# Patient Record
Sex: MALE | Race: WHITE | NOT HISPANIC OR LATINO | Employment: FULL TIME | URBAN - NONMETROPOLITAN AREA
[De-identification: names, ages, dates, MRNs, and addresses within clinical notes are randomized per-mention and may not be internally consistent; named-entity substitution may affect disease eponyms.]

---

## 2019-03-07 ENCOUNTER — OFFICE VISIT (OUTPATIENT)
Dept: MEDICAL GROUP | Facility: CLINIC | Age: 49
End: 2019-03-07
Payer: COMMERCIAL

## 2019-03-07 VITALS
SYSTOLIC BLOOD PRESSURE: 136 MMHG | OXYGEN SATURATION: 99 % | RESPIRATION RATE: 16 BRPM | BODY MASS INDEX: 32.02 KG/M2 | HEART RATE: 86 BPM | HEIGHT: 67 IN | TEMPERATURE: 98.5 F | WEIGHT: 204 LBS | DIASTOLIC BLOOD PRESSURE: 83 MMHG

## 2019-03-07 DIAGNOSIS — H93.8X1 CONGESTION OF RIGHT EAR: ICD-10-CM

## 2019-03-07 DIAGNOSIS — J40 BRONCHITIS: ICD-10-CM

## 2019-03-07 PROCEDURE — 99203 OFFICE O/P NEW LOW 30 MIN: CPT | Performed by: PHYSICIAN ASSISTANT

## 2019-03-07 RX ORDER — AMOXICILLIN AND CLAVULANATE POTASSIUM 875; 125 MG/1; MG/1
1 TABLET, FILM COATED ORAL 2 TIMES DAILY
Qty: 20 TAB | Refills: 0 | Status: SHIPPED | OUTPATIENT
Start: 2019-03-07 | End: 2019-08-06

## 2019-03-07 RX ORDER — ALBUTEROL SULFATE 90 UG/1
2 AEROSOL, METERED RESPIRATORY (INHALATION) EVERY 6 HOURS PRN
Qty: 8.5 G | Refills: 1 | Status: SHIPPED | OUTPATIENT
Start: 2019-03-07 | End: 2019-08-06

## 2019-03-07 RX ORDER — METHYLPREDNISOLONE 4 MG/1
4 TABLET ORAL DAILY
Qty: 1 KIT | Refills: 0 | Status: SHIPPED | OUTPATIENT
Start: 2019-03-07 | End: 2019-08-06

## 2019-03-07 ASSESSMENT — PATIENT HEALTH QUESTIONNAIRE - PHQ9: CLINICAL INTERPRETATION OF PHQ2 SCORE: 0

## 2019-03-07 NOTE — PROGRESS NOTES
"cc:  illness    Subjective:     Gerry Rose is a 48 y.o. male presenting for fatigue      Patient states that for the last 5-6 days he has been fatigues, sleeping frequently, feverish and hands and feet cold.  He has been diaphoretic.  He has been wheezing and feels fluid in the right ear.  He states that abx usually clear up the fluid.  He has had sinus congestion and a cough.  He had similar symptoms from November to January.  He states it felt like his ribs were broken.  He has been off work for 4 days.      Review of systems:  See above. Denies any other symptoms unless previously indicated.       Current Outpatient Prescriptions:   •  amoxicillin-clavulanate (AUGMENTIN) 875-125 MG Tab, Take 1 Tab by mouth 2 times a day., Disp: 20 Tab, Rfl: 0  •  MethylPREDNISolone (MEDROL DOSEPAK) 4 MG Tablet Therapy Pack, Take 1 Tab by mouth every day., Disp: 1 Kit, Rfl: 0  •  albuterol 108 (90 Base) MCG/ACT Aero Soln inhalation aerosol, Inhale 2 Puffs by mouth every 6 hours as needed for Shortness of Breath., Disp: 8.5 g, Rfl: 1    Allergies, past medical history, past surgical history, family history, social history reviewed and updated    Objective:     Vitals: /83 (BP Location: Right arm, Patient Position: Sitting)   Pulse 86   Temp 36.9 °C (98.5 °F)   Resp 16   Ht 1.702 m (5' 7\")   Wt 92.5 kg (204 lb)   SpO2 99%   BMI 31.95 kg/m²   General: Alert, pleasant, NAD  HEENT: Normocephalic.  EOMI, no icterus or pallor.  Conjunctivae and lids normal. External ears normal.  Internal ear canals clear left side wax versus perforation eardrum right ear.  Believe this is most likely wax.  Oropharynx non-erythematous, mucous membranes moist.  Neck supple.  No cervical or supraclavicular lymphadenopathy.  Heart: Regular rate and rhythm.  S1 and S2 normal.  No murmurs appreciated.  Respiratory: Wheezing in all lung fields.  Rhonchi lower bases.  Skin: Warm, dry, no rashes.  Musculoskeletal: Gait is normal.  Moves all " extremities well.  Neuro: Cranial nerves II through XII intact.  No focal deficits noted.  Psych:  Affect/mood is normal, judgement is good, memory is intact, grooming is appropriate.    Assessment/Plan:     Gerry was seen today for cough and fever.    Diagnoses and all orders for this visit:    Bronchitis  -     amoxicillin-clavulanate (AUGMENTIN) 875-125 MG Tab; Take 1 Tab by mouth 2 times a day.  -     MethylPREDNISolone (MEDROL DOSEPAK) 4 MG Tablet Therapy Pack; Take 1 Tab by mouth every day.  -     albuterol 108 (90 Base) MCG/ACT Aero Soln inhalation aerosol; Inhale 2 Puffs by mouth every 6 hours as needed for Shortness of Breath.  Congestion of right ear    Cannot exclude pneumonia.  Will start patient on Augmentin Medrol Dosepak and albuterol we will follow-up in 1-2 weeks and will recheck right ear.  Do not believe he has a perforated eardrum but would like to reevaluate to make sure.    Return in about 2 weeks (around 3/21/2019) for 1-2 weeks.

## 2019-08-06 ENCOUNTER — OFFICE VISIT (OUTPATIENT)
Dept: MEDICAL GROUP | Facility: CLINIC | Age: 49
End: 2019-08-06
Payer: COMMERCIAL

## 2019-08-06 VITALS
WEIGHT: 194 LBS | BODY MASS INDEX: 30.45 KG/M2 | OXYGEN SATURATION: 96 % | RESPIRATION RATE: 16 BRPM | TEMPERATURE: 98.3 F | HEART RATE: 82 BPM | HEIGHT: 67 IN | SYSTOLIC BLOOD PRESSURE: 190 MMHG | DIASTOLIC BLOOD PRESSURE: 104 MMHG

## 2019-08-06 DIAGNOSIS — N52.1 ERECTILE DYSFUNCTION DUE TO DISEASES CLASSIFIED ELSEWHERE: ICD-10-CM

## 2019-08-06 DIAGNOSIS — J30.2 SEASONAL ALLERGIES: ICD-10-CM

## 2019-08-06 DIAGNOSIS — R03.0 ELEVATED BLOOD PRESSURE READING: ICD-10-CM

## 2019-08-06 PROBLEM — J40 BRONCHITIS: Status: RESOLVED | Noted: 2019-03-07 | Resolved: 2019-08-06

## 2019-08-06 PROBLEM — H93.8X1 CONGESTION OF RIGHT EAR: Status: RESOLVED | Noted: 2019-03-07 | Resolved: 2019-08-06

## 2019-08-06 PROCEDURE — 99213 OFFICE O/P EST LOW 20 MIN: CPT | Mod: 25 | Performed by: PHYSICIAN ASSISTANT

## 2019-08-06 RX ORDER — LORATADINE 10 MG/1
10 TABLET ORAL DAILY
COMMUNITY

## 2019-08-06 NOTE — PROGRESS NOTES
"cc:  Medication refill    Subjective:     Gerry Rose is a 48 y.o. male presenting for medication refill      Patient presents to the office for medication refill.  Patient indicates that he is here for a cialis Rx.  He states that he has not been active for 8 years.  He states that a friend gave him a sample years ago and it worked well for him.  He has been feeling well.  He has been somewhat anxious lately.  He states that lately has gone back to drink edmund energy drinks.      Patient indicates that he does have a history of a deviated septum and is having issues with allergies.  He states that he has this continual drainage feeling from his nose and his right ear.  Claritin does help with this and he would like to continue with its use.    Review of systems:  See above.  Denies any other symptoms unless previously indicated.      Current Outpatient Medications:   •  loratadine (CLARITIN) 10 MG Tab, Take 10 mg by mouth every day., Disp: , Rfl:     Allergies, past medical history, past surgical history, family history, social history reviewed and updated    Objective:     Vitals: BP (!) 190/104 (BP Location: Right arm, Patient Position: Sitting)   Pulse 82   Temp 36.8 °C (98.3 °F)   Resp 16   Ht 1.702 m (5' 7\")   Wt 88 kg (194 lb)   SpO2 96%   BMI 30.38 kg/m²    General: Alert, pleasant, NAD  EYES:   PERRL, EOMI, no icterus or pallor.  Conjunctivae and lids normal.   HENT:  Normocephalic.  External ears normal. Tympanic membranes pearly, opaque.  No nasal drainage present.    Neck supple.   Heart: Regular rate and rhythm.  S1 and S2 normal.  No murmurs appreciated.  Respiratory: Normal respiratory effort.  Clear to auscultation bilaterally.  Abdomen: Not obese.  Skin: Warm, dry, no rashes.  Musculoskeletal: Gait is normal.  Moves all extremities well.  Extremities: No leg edema.  Pedal pulses 2+ symmetric.   Neurological: No tremors, sensation grossly intact, CN2-12 intact.  Psych:  Affect/mood is normal, " judgement is good, memory is intact, grooming is appropriate.    Assessment/Plan:     Gerry was seen today for medication refill.    Diagnoses and all orders for this visit:    Erectile dysfunction due to diseases classified elsewhere  Elevated blood pressure reading    Believe elevated blood pressure reading is most likely related to diet and stress levels.  I cannot provide Cialis Rx at this time with blood pressure readings so high.  Patient is not wanting to go on blood pressure medication.  Therefore we will follow-up in 2 weeks.  He will avoid caffeine improved diet and we will recheck his blood pressure.  If improved, will order Cialis.  If not we will need to consider blood pressure medication.  20 minutes waiting in office for recheck of blood pressure.    Seasonal allergies  Continue with Claritin as needed.      No follow-ups on file.    Please note that this dictation was created using voice recognition software. I have made every reasonable attempt to correct obvious errors, but expect that there are errors of grammar and possible content that I did not discover before finalizing note.

## 2019-08-20 ENCOUNTER — OFFICE VISIT (OUTPATIENT)
Dept: MEDICAL GROUP | Facility: CLINIC | Age: 49
End: 2019-08-20
Payer: COMMERCIAL

## 2019-08-20 VITALS
TEMPERATURE: 99.2 F | HEART RATE: 90 BPM | WEIGHT: 191 LBS | RESPIRATION RATE: 16 BRPM | HEIGHT: 67 IN | SYSTOLIC BLOOD PRESSURE: 154 MMHG | OXYGEN SATURATION: 97 % | DIASTOLIC BLOOD PRESSURE: 85 MMHG | BODY MASS INDEX: 29.98 KG/M2

## 2019-08-20 DIAGNOSIS — N52.1 ERECTILE DYSFUNCTION DUE TO DISEASES CLASSIFIED ELSEWHERE: ICD-10-CM

## 2019-08-20 DIAGNOSIS — R03.0 ELEVATED BLOOD PRESSURE READING: ICD-10-CM

## 2019-08-20 PROCEDURE — 99213 OFFICE O/P EST LOW 20 MIN: CPT | Performed by: PHYSICIAN ASSISTANT

## 2019-08-20 RX ORDER — TADALAFIL 10 MG/1
10 TABLET ORAL PRN
Qty: 10 TAB | Refills: 3 | Status: SHIPPED | OUTPATIENT
Start: 2019-08-20

## 2019-08-20 NOTE — PROGRESS NOTES
"cc:  hypertension    Subjective:     Gerry Rose is a 48 y.o. male presenting for hypertension      Patient was originally seen a couple of weeks ago requesting medication for ED.  However his blood pressure was found to be quite elevated at the time.  At that visit, we advised him to stop all caffeine/energy drinks and follow-up in a couple of weeks for recheck of his blood pressure.  He states that he has avoided supplements as well as he is concerned as they could be causing an elevated blood pressure.  He denies any other symptoms at this time.  He does acknowledge that diet could be playing a role in his elevated blood pressure indicating that he has been eating more foods with higher levels of salt.    Review of systems:  See above.  Denies any symptoms at this time.      Current Outpatient Medications:   •  tadalafil (CIALIS) 10 MG tablet, Take 1 Tab by mouth as needed for Erectile Dysfunction., Disp: 10 Tab, Rfl: 3  •  loratadine (CLARITIN) 10 MG Tab, Take 10 mg by mouth every day., Disp: , Rfl:     Allergies, past medical history, past surgical history, family history, social history reviewed and updated    Objective:     Vitals: /85 (BP Location: Left arm, Patient Position: Sitting, BP Cuff Size: Adult)   Pulse 90   Temp 37.3 °C (99.2 °F) (Temporal)   Resp 16   Ht 1.702 m (5' 7\")   Wt 86.6 kg (191 lb)   SpO2 97%   BMI 29.91 kg/m²   General: Alert, pleasant, NAD  EYES:   PERRL, EOMI, no icterus or pallor.  Conjunctivae and lids normal.   HENT:  Normocephalic.  External ears normal.  Neck supple.   Abdomen: Not obese  Skin: Warm, dry, no rashes.  Musculoskeletal: Gait is normal.  Moves all extremities well.  Neurological: No tremors, sensation grossly intact, CN2-12 intact.  Psych:  Affect/mood is normal, judgement is good, memory is intact, grooming is appropriate.    Assessment/Plan:     Gerry was seen today for follow-up.    Diagnoses and all orders for this visit:    Elevated blood " pressure reading    Improved.  Continue to work on dietary and exercise changes.  Follow-up in 4 weeks, sooner if needed peer    Erectile dysfunction due to diseases classified elsewhere  -     tadalafil (CIALIS) 10 MG tablet; Take 1 Tab by mouth as needed for Erectile Dysfunction.    Blood pressure is better.  We will try patient on Cialis.  Side effects of medication explained.    Return in about 4 weeks (around 9/17/2019), or if symptoms worsen or fail to improve.    Please note that this dictation was created using voice recognition software. I have made every reasonable attempt to correct obvious errors, but expect that there are errors of grammar and possible content that I did not discover before finalizing note.

## 2019-10-08 ENCOUNTER — OFFICE VISIT (OUTPATIENT)
Dept: MEDICAL GROUP | Facility: CLINIC | Age: 49
End: 2019-10-08
Payer: COMMERCIAL

## 2019-10-08 VITALS
DIASTOLIC BLOOD PRESSURE: 90 MMHG | WEIGHT: 193 LBS | HEIGHT: 67 IN | SYSTOLIC BLOOD PRESSURE: 150 MMHG | OXYGEN SATURATION: 96 % | RESPIRATION RATE: 16 BRPM | HEART RATE: 89 BPM | BODY MASS INDEX: 30.29 KG/M2 | TEMPERATURE: 98.4 F

## 2019-10-08 DIAGNOSIS — M67.40 GANGLION CYST: ICD-10-CM

## 2019-10-08 DIAGNOSIS — Z98.52 S/P VASECTOMY: ICD-10-CM

## 2019-10-08 DIAGNOSIS — K64.8 OTHER HEMORRHOIDS: ICD-10-CM

## 2019-10-08 DIAGNOSIS — L73.1 INGROWN HAIR: ICD-10-CM

## 2019-10-08 DIAGNOSIS — N52.1 ERECTILE DYSFUNCTION DUE TO DISEASES CLASSIFIED ELSEWHERE: ICD-10-CM

## 2019-10-08 DIAGNOSIS — K42.9 UMBILICAL HERNIA WITHOUT OBSTRUCTION AND WITHOUT GANGRENE: ICD-10-CM

## 2019-10-08 PROCEDURE — 99214 OFFICE O/P EST MOD 30 MIN: CPT | Performed by: PHYSICIAN ASSISTANT

## 2019-10-08 RX ORDER — CEPHALEXIN 250 MG/1
250 CAPSULE ORAL 4 TIMES DAILY
Qty: 40 CAP | Refills: 0 | Status: SHIPPED | OUTPATIENT
Start: 2019-10-08

## 2019-10-08 RX ORDER — SILDENAFIL CITRATE 20 MG/1
TABLET ORAL
Qty: 20 TAB | Refills: 2 | Status: SHIPPED | OUTPATIENT
Start: 2019-10-08

## 2019-10-08 NOTE — PROGRESS NOTES
cc:  effie    Subjective:     Gerry Rose is a 49 y.o. male presenting for waiver      Patient presents to the office for waiver.  He presents indicating that he needs a waiver in order to avoid shaving at work.  He finds that when he shaves that he develops folliculitis.  He actually has 2 spots in the groin that are present today because of shaving in the area.    Patient states that he is concerned about a herniated umbilical area. He is not having any pain.  He noticed this in the last 6 weeks.  He is not wanting to treat at this time but would like for this clinic to be aware.    Patient is wanting a semen analysis.  He has a history of a vasectomy.  He did not have a semen analysis after his vasectomy and would like to make sure he does not have a sperm count.      Patient states that he has a hemorrhoid,  He will have to push the hemorrhoid back in the rectum.  He has had this for 2 years.  He denies constipation.  He states that he had issues at the age of 6 weeks.  He states that he had difficulty waking up from anesthesia and is concerned about this.      Patient complains of a lump on the posterior surface of his right hand.  He states that it has been there for years.  There is no pain but will shrink and then will get bigger.     Patient is also requesting to try Viagra.  He indicates that with the Cialis, he had dry ejaculate.  This is bothersome and would like to try a different medication.    Review of systems:  See above. Denies any other symptoms unless previously indicated.        Current Outpatient Medications:   •  cephALEXin (KEFLEX) 250 MG Cap, Take 1 Cap by mouth 4 times a day., Disp: 40 Cap, Rfl: 0  •  sildenafil (REVATIO) 20 MG tablet, Use 1-5 tabs as needed., Disp: 20 Tab, Rfl: 2  •  tadalafil (CIALIS) 10 MG tablet, Take 1 Tab by mouth as needed for Erectile Dysfunction., Disp: 10 Tab, Rfl: 3  •  loratadine (CLARITIN) 10 MG Tab, Take 10 mg by mouth every day., Disp: , Rfl:  "    Allergies, past medical history, past surgical history, family history, social history reviewed and updated    Objective:     Vitals: /90 (BP Location: Left arm, Patient Position: Sitting, BP Cuff Size: Large adult)   Pulse 89   Temp 36.9 °C (98.4 °F) (Temporal)   Resp 16   Ht 1.702 m (5' 7\")   Wt 87.5 kg (193 lb)   SpO2 96%   BMI 30.23 kg/m²   General: Alert, pleasant, NAD  EYES:   PERRL, EOMI, no icterus or pallor.  Conjunctivae and lids normal.   HENT:  Normocephalic.  External ears normal.  Neck supple.   Abdomen: obese.  Umbilical hernia present  Skin: Warm, dry, no rashes. Folliculitis in groin bilaterally  Musculoskeletal: Gait is normal.  Moves all extremities well.  Ganglion cyst posterior surface right hand at wrist just below thumb and index finger.  approximately 1 centimeter in diameter  : No palpable hemorrhoid at this time.  Prostate is soft smooth with no lumps.  Approximately 50 to 60 g.  Folliculitis in the groin.  More prominent lesion left side.  The lesion less prominent seen on the right side.  Neurological: No tremors, sensation grossly intact, CN2-12 intact.  Psych:  Affect/mood is normal, judgement is good, memory is intact, grooming is appropriate.    Assessment/Plan:     Gerry was seen today for other.    Diagnoses and all orders for this visit:    Ingrown hair  -     cephALEXin (KEFLEX) 250 MG Cap; Take 1 Cap by mouth 4 times a day.    As patient is able to show that he does obtain folliculitis from shaving, we will provide release.  In the meantime we will send cephalexin to pharmacy to treat his current folliculitis.    Other hemorrhoids  -     REFERRAL TO GI FOR COLONOSCOPY    Referral to GI for further evaluation.  Patient is also due for routine screening.  Once we have colonoscopy, will refer to general surgery for further evaluation.    Umbilical hernia without obstruction and without gangrene    Offered patient surgical referral which she declined at this time.  " Will let us know if his symptoms change and if he reconsiders referral.    S/P vasectomy  -     SEMEN ANALYSIS,POSTVASECTOMY; Future  Labs as indicated.    Ganglion cyst    Offered orthopedic referral which she declined at this time.    Erectile dysfunction due to diseases classified elsewhere  -     sildenafil (REVATIO) 20 MG tablet; Use 1-5 tabs as needed.  Stop Cialis at this time.  We will try sildenafil.  Patient instructed on use of medication.      No follow-ups on file.    Please note that this dictation was created using voice recognition software. I have made every reasonable attempt to correct obvious errors, but expect that there are errors of grammar and possible content that I did not discover before finalizing note.

## 2019-10-08 NOTE — LETTER
October 8, 2019       Patient: Gerry Rose   YOB: 1970   Date of Visit: 10/8/2019         To Whom It May Concern:    It is my medical opinion that Gerry Rose should limit shaving due to increased ability to develop ingrown hairs.    If you have any questions or concerns, please don't hesitate to call [phone]          Sincerely,          Marilyn Morton P.A.-C.  Electronically Signed

## 2020-02-24 ENCOUNTER — TELEMEDICINE2 (OUTPATIENT)
Dept: URGENT CARE | Facility: CLINIC | Age: 50
End: 2020-02-24
Payer: COMMERCIAL

## 2020-02-24 VITALS
TEMPERATURE: 98.6 F | OXYGEN SATURATION: 95 % | HEIGHT: 66 IN | HEART RATE: 90 BPM | BODY MASS INDEX: 31.82 KG/M2 | RESPIRATION RATE: 16 BRPM | WEIGHT: 198 LBS | SYSTOLIC BLOOD PRESSURE: 173 MMHG | DIASTOLIC BLOOD PRESSURE: 96 MMHG

## 2020-02-24 DIAGNOSIS — H81.10 BENIGN PAROXYSMAL POSITIONAL VERTIGO, UNSPECIFIED LATERALITY: ICD-10-CM

## 2020-02-24 PROCEDURE — 99203 OFFICE O/P NEW LOW 30 MIN: CPT | Mod: GT | Performed by: FAMILY MEDICINE

## 2020-02-24 RX ORDER — MECLIZINE HYDROCHLORIDE 25 MG/1
25 TABLET ORAL 3 TIMES DAILY PRN
Qty: 20 TAB | Refills: 0 | Status: SHIPPED | OUTPATIENT
Start: 2020-02-24

## 2020-02-24 ASSESSMENT — ENCOUNTER SYMPTOMS
SORE THROAT: 0
SHORTNESS OF BREATH: 0
HEADACHES: 0
VOMITING: 0
EYE PAIN: 0
COUGH: 0
DIZZINESS: 1
FEVER: 0
CHILLS: 0
MYALGIAS: 0
NAUSEA: 0

## 2020-02-24 NOTE — PROGRESS NOTES
Subjective:   Gerry Rose is a 49 y.o. male who presents for Other (feeling dizzy feels like fluid in his R ear room is spinning)        49-year-old male presents to the urgent care via telemedicine with a chief complaint of dizziness and a sensation of the room spinning.  The patient awoke 2 days prior and noticed sensation of the room spinning with position change.  Patient denies having similar symptoms in the past.  Patient denies nausea or vomiting.  Patient states his symptoms primarily occur with position change and feels similar to being intoxicated without any alcohol consumption.     The history and physical exam were obtained with the assistance of telemedicine and telemedicine modalities and RN assistance.  The patient did confirm identity.    Other   Associated symptoms include congestion. Pertinent negatives include no chest pain, chills, coughing, fever, headaches, myalgias, nausea, rash, sore throat or vomiting.     PMH:  has a past medical history of Pyloric stenosis.  MEDS:   Current Outpatient Medications:   •  meclizine (ANTIVERT) 25 MG Tab, Take 1 Tab by mouth 3 times a day as needed for Dizziness., Disp: 20 Tab, Rfl: 0  •  sildenafil (REVATIO) 20 MG tablet, Use 1-5 tabs as needed., Disp: 20 Tab, Rfl: 2  •  tadalafil (CIALIS) 10 MG tablet, Take 1 Tab by mouth as needed for Erectile Dysfunction., Disp: 10 Tab, Rfl: 3  •  cephALEXin (KEFLEX) 250 MG Cap, Take 1 Cap by mouth 4 times a day., Disp: 40 Cap, Rfl: 0  •  loratadine (CLARITIN) 10 MG Tab, Take 10 mg by mouth every day., Disp: , Rfl:   ALLERGIES: No Known Allergies  SURGHX: History reviewed. No pertinent surgical history.  SOCHX:  reports that he has never smoked. He has never used smokeless tobacco. He reports that he does not drink alcohol or use drugs.  FH: Family history was reviewed  Review of Systems   Constitutional: Negative for chills and fever.   HENT: Positive for congestion. Negative for hearing loss and sore throat.    Eyes:  "Negative for pain.   Respiratory: Negative for cough and shortness of breath.    Cardiovascular: Negative for chest pain.   Gastrointestinal: Negative for nausea and vomiting.   Genitourinary: Negative for dysuria.   Musculoskeletal: Negative for myalgias.   Skin: Negative for rash.   Neurological: Positive for dizziness. Negative for headaches.   All other systems reviewed and are negative.       Objective:   BP (!) 173/96 (BP Location: Right arm, Patient Position: Sitting, BP Cuff Size: Large adult)   Pulse 90   Temp 37 °C (98.6 °F) (Temporal)   Resp 16   Ht 1.676 m (5' 6\")   Wt 89.8 kg (198 lb)   SpO2 95%   BMI 31.96 kg/m²   Physical Exam  Vitals signs and nursing note reviewed.   Constitutional:       General: He is not in acute distress.     Appearance: He is well-developed.   HENT:      Head: Normocephalic and atraumatic.      Right Ear: Tympanic membrane and external ear normal.      Left Ear: Tympanic membrane and external ear normal.      Nose: Nose normal. No congestion or rhinorrhea.      Mouth/Throat:      Mouth: Mucous membranes are moist.   Eyes:      Extraocular Movements: Extraocular movements intact.      Right eye: Normal extraocular motion and no nystagmus.      Left eye: Normal extraocular motion and no nystagmus.      Conjunctiva/sclera: Conjunctivae normal.      Pupils: Pupils are equal, round, and reactive to light.   Cardiovascular:      Rate and Rhythm: Normal rate and regular rhythm.      Heart sounds: No murmur.   Pulmonary:      Effort: Pulmonary effort is normal. No respiratory distress.      Breath sounds: Normal breath sounds.   Abdominal:      General: There is no distension.      Palpations: Abdomen is soft.      Tenderness: There is no abdominal tenderness.   Musculoskeletal: Normal range of motion.   Skin:     General: Skin is warm and dry.   Neurological:      General: No focal deficit present.      Mental Status: He is alert and oriented to person, place, and time. Mental " status is at baseline.      Motor: Motor function is intact.      Coordination: Coordination is intact. Romberg sign negative. Coordination normal.      Gait: Gait (gait at baseline) normal.   Psychiatric:         Judgment: Judgment normal.     Medical decision-making/course: The patient remained afebrile, hemodynamically and neurologically stable with no evidence of respiratory compromise throughout the urgent care telemedicine course.  The elevated blood pressure was noted consistent with previous visits.  There was no immediate clinical indication for the necessity of emergency department evaluation or inpatient admission and the patient requested a trial of outpatient management.          Assessment/Plan:   1. Benign paroxysmal positional vertigo, unspecified laterality  - meclizine (ANTIVERT) 25 MG Tab; Take 1 Tab by mouth 3 times a day as needed for Dizziness.  Dispense: 20 Tab; Refill: 0    The patient was advised to follow-up with her primary care provider for recheck reevaluation consideration of further management for any persistent symptoms.    Discussed close monitoring, return precautions, and supportive measures including maintaining adequate fluid hydration and caloric intake, relative rest and OTC symptom management including acetaminophen as needed for pain and/or fever.    Differential diagnosis, natural history, supportive care, and indications for immediate follow-up discussed.     Advised the patient to follow-up with the primary care physician for recheck, reevaluation, and consideration of further management.

## 2020-02-24 NOTE — PATIENT INSTRUCTIONS
Benign Positional Vertigo  Introduction  Vertigo is the feeling that you or your surroundings are moving when they are not. Benign positional vertigo is the most common form of vertigo. The cause of this condition is not serious (is benign). This condition is triggered by certain movements and positions (is positional). This condition can be dangerous if it occurs while you are doing something that could endanger you or others, such as driving.  What are the causes?  In many cases, the cause of this condition is not known. It may be caused by a disturbance in an area of the inner ear that helps your brain to sense movement and balance. This disturbance can be caused by a viral infection (labyrinthitis), head injury, or repetitive motion.  What increases the risk?  This condition is more likely to develop in:  · Women.  · People who are 50 years of age or older.  What are the signs or symptoms?  Symptoms of this condition usually happen when you move your head or your eyes in different directions. Symptoms may start suddenly, and they usually last for less than a minute. Symptoms may include:  · Loss of balance and falling.  · Feeling like you are spinning or moving.  · Feeling like your surroundings are spinning or moving.  · Nausea and vomiting.  · Blurred vision.  · Dizziness.  · Involuntary eye movement (nystagmus).  Symptoms can be mild and cause only slight annoyance, or they can be severe and interfere with daily life. Episodes of benign positional vertigo may return (recur) over time, and they may be triggered by certain movements. Symptoms may improve over time.  How is this diagnosed?  This condition is usually diagnosed by medical history and a physical exam of the head, neck, and ears. You may be referred to a health care provider who specializes in ear, nose, and throat (ENT) problems (otolaryngologist) or a provider who specializes in disorders of the nervous system (neurologist). You may have  additional testing, including:  · MRI.  · A CT scan.  · Eye movement tests. Your health care provider may ask you to change positions quickly while he or she watches you for symptoms of benign positional vertigo, such as nystagmus. Eye movement may be tested with an electronystagmogram (ENG), caloric stimulation, the Lucio-Hallpike test, or the roll test.  · An electroencephalogram (EEG). This records electrical activity in your brain.  · Hearing tests.  How is this treated?  Usually, your health care provider will treat this by moving your head in specific positions to adjust your inner ear back to normal. Surgery may be needed in severe cases, but this is rare. In some cases, benign positional vertigo may resolve on its own in 2-4 weeks.  Follow these instructions at home:  Safety  · Move slowly.Avoid sudden body or head movements.  · Avoid driving.  · Avoid operating heavy machinery.  · Avoid doing any tasks that would be dangerous to you or others if a vertigo episode would occur.  · If you have trouble walking or keeping your balance, try using a cane for stability. If you feel dizzy or unstable, sit down right away.  · Return to your normal activities as told by your health care provider. Ask your health care provider what activities are safe for you.  General instructions  · Take over-the-counter and prescription medicines only as told by your health care provider.  · Avoid certain positions or movements as told by your health care provider.  · Drink enough fluid to keep your urine clear or pale yellow.  · Keep all follow-up visits as told by your health care provider. This is important.  Contact a health care provider if:  · You have a fever.  · Your condition gets worse or you develop new symptoms.  · Your family or friends notice any behavioral changes.  · Your nausea or vomiting gets worse.  · You have numbness or a “pins and needles” sensation.  Get help right away if:  · You have difficulty speaking or  moving.  · You are always dizzy.  · You faint.  · You develop severe headaches.  · You have weakness in your legs or arms.  · You have changes in your hearing or vision.  · You develop a stiff neck.  · You develop sensitivity to light.  This information is not intended to replace advice given to you by your health care provider. Make sure you discuss any questions you have with your health care provider.  Document Released: 09/25/2007 Document Revised: 05/25/2017 Document Reviewed: 04/11/2016  © 2017 [employer]